# Patient Record
Sex: FEMALE | Race: ASIAN | NOT HISPANIC OR LATINO | Employment: OTHER | URBAN - METROPOLITAN AREA
[De-identification: names, ages, dates, MRNs, and addresses within clinical notes are randomized per-mention and may not be internally consistent; named-entity substitution may affect disease eponyms.]

---

## 2021-05-26 ENCOUNTER — RECORDS - HEALTHEAST (OUTPATIENT)
Dept: ADMINISTRATIVE | Facility: CLINIC | Age: 61
End: 2021-05-26

## 2023-08-03 ENCOUNTER — OFFICE VISIT (OUTPATIENT)
Dept: FAMILY MEDICINE | Facility: CLINIC | Age: 63
End: 2023-08-03
Payer: COMMERCIAL

## 2023-08-03 VITALS
WEIGHT: 128.8 LBS | TEMPERATURE: 98.2 F | OXYGEN SATURATION: 95 % | HEIGHT: 59 IN | SYSTOLIC BLOOD PRESSURE: 134 MMHG | BODY MASS INDEX: 25.96 KG/M2 | HEART RATE: 91 BPM | DIASTOLIC BLOOD PRESSURE: 76 MMHG

## 2023-08-03 DIAGNOSIS — Z76.89 ENCOUNTER TO ESTABLISH CARE: Primary | ICD-10-CM

## 2023-08-03 DIAGNOSIS — R21 RASH AND NONSPECIFIC SKIN ERUPTION: ICD-10-CM

## 2023-08-03 PROBLEM — E55.9 VITAMIN D DEFICIENCY: Status: ACTIVE | Noted: 2023-08-03

## 2023-08-03 PROBLEM — M54.50 LOWER BACK PAIN: Status: ACTIVE | Noted: 2023-08-03

## 2023-08-03 PROBLEM — M79.609 LIMB PAIN: Status: ACTIVE | Noted: 2023-08-03

## 2023-08-03 PROCEDURE — 99203 OFFICE O/P NEW LOW 30 MIN: CPT | Mod: GC

## 2023-08-03 ASSESSMENT — PATIENT HEALTH QUESTIONNAIRE - PHQ9: SUM OF ALL RESPONSES TO PHQ QUESTIONS 1-9: 12

## 2023-08-03 NOTE — PROGRESS NOTES
Assessment & Plan     Encounter to establish care  Here to establish care.  Previously followed at the [facility].  Past medical history significant for major depressive disorder.  Has been on chronic medications in the past, does not know the name.  ISIDRO filled out.  We will have her come back in about a month with her empty pill bottles.  We will base routine screenings/lab work depending on past medical history.    Rash and nonspecific skin eruption  Developed rash in her right lower extremity 2 days ago.  Has mostly resolved at this point.  A few small erythematous macules present on the lower shin.  Unclear etiology, poss related to some contact she had while gardening.  Overall asymptomatic this point.  We will continue to monitor it.  Follow-up precautions provided.        No follow-ups on file.    Dileep Christianson MD  [facility]    Jeffrey Cain is a 63 year old, presenting for the following health issues:History significant for MDD, R knee arthritis   Derm Problem (Right leg)    HPI     Leg rash  Noticed 2 days ago   Right leg   Has improved.  Did not itch   Has never had anything like this before  Was in the garden recently, was not wearing boots, like she normally would.   Got a cream from the pharmacy (oral gel) - was helpful  No new lotions, detergents, soaps. No new clothes.     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  Has not been seen since.  Transferring care back to [place].    Taking Vitamin D and some other medications, Does not know the names  Goes to [facility] 5 days per week.       PHQ9 - 12   Has taken anti depressive medications in the past   No thoughts of self harm    Past medical history  Major depressive disorder -previously medication, discontinued due to side effects (possibly amitriptyline?)  No known history of hypertension, hyperlipidemia, diabetes.  No known history of cancer.      Surgical Hx  - None  "    Social history  - Lives with  and children  - No smoking   - No EtOH  - Enjoys gardening       Review of Systems   Constitutional, HEENT, cardiovascular, pulmonary, gi and gu systems are negative, except as otherwise noted.      Objective    /76   Pulse 91   Temp 98.2  F (36.8  C) (Oral)   Ht 1.51 m (4' 11.45\")   Wt 58.4 kg (128 lb 12.8 oz)   SpO2 95%   BMI 25.62 kg/m    Body mass index is 25.62 kg/m .    Physical Exam  GENERAL: healthy, alert and no distress  EYES: Eyes grossly normal to inspection, PERRL and conjunctivae and sclerae normal  HENT: ear canals and TM's normal, nose and mouth without ulcers or lesions  NECK: no adenopathy, no asymmetry, masses, or scars and thyroid normal to palpation  RESP: lungs clear to auscultation - no rales, rhonchi or wheezes  CV: regular rate and rhythm, normal S1 S2, no S3 or S4, no murmur, click or rub, no peripheral edema and peripheral pulses strong  ABDOMEN: soft, nontender, no hepatosplenomegaly, no masses and bowel sounds normal  MS: no gross musculoskeletal defects noted, no edema  SKIN: Few scattered faint red macules on the right lower shin.  NEURO: Normal strength and tone, mentation intact and speech normal  PSYCH: mentation appears normal, affect normal/bright          "

## 2023-08-03 NOTE — PATIENT INSTRUCTIONS
Please follow-up in 1 month. Please bring in your pill bottles at that time. We will discuss further testing at that time,

## 2025-01-18 ENCOUNTER — HEALTH MAINTENANCE LETTER (OUTPATIENT)
Age: 65
End: 2025-01-18

## 2025-05-17 ENCOUNTER — HEALTH MAINTENANCE LETTER (OUTPATIENT)
Age: 65
End: 2025-05-17